# Patient Record
(demographics unavailable — no encounter records)

---

## 2025-01-29 NOTE — ASSESSMENT
[Diabetes Foot Care] : diabetes foot care [Long Term Vascular Complications] : long term vascular complications of diabetes [Carbohydrate Consistent Diet] : carbohydrate consistent diet [Importance of Diet and Exercise] : importance of diet and exercise to improve glycemic control, achieve weight loss and improve cardiovascular health [Exercise/Effect on Glucose] : exercise/effect on glucose [Hypoglycemia Management] : hypoglycemia management [Retinopathy Screening] : Patient was referred to ophthalmology for retinopathy screening [FreeTextEntry1] : Young white male with a past medical history of a type 2 diabetes, anxiety, hypertension, history of coronary artery disease and diffuse myalgia presents for routine follow-up.  Patient is tolerating the Trulicity 3 mg once a week together with the Synjardy 12.5/1000 mg twice daily.  His latest hemoglobin A1c is 5.5%, normal 3.49 and the LDL is 55.  Liver function test are normal and the total testosterone is low normal at 343.  Patient is trying hard to comply with the diet and has succeeded in losing approximately 20 pounds.  Patient also takes the levothyroxine 150 mcg tablets daily.  Patient recently was started on both and gabapentin with mild improvement.  Patient is clinically euthyroid but he still has diffuse myalgia for which he was evaluated by a neurologist.  And the workup is still in progress recommendation 1 I have advised the patient to continue with the Trulicity 3.0 milligrams once a week and the Synjardy 12.5/1000 mg tablet twice daily 2.  Patient will continue with the levothyroxine 150 mcg tablets daily 3.  The importance of strict low carbohydrate and low-fat diet bind with exercise was discussed with the patient in detail. 4.  Patient will continue with all the other medications which include losartan, Plavix, Repatha, baby aspirin, Protonix and losartan with Neurontin. 5.  Patient is encouraged to exercise on a regular basis and to continue his weight loss regimen, he will also continue the freestyle aaron 3 for monitoring of his blood sugars. 6.  The patient remains clinically well he will follow-up in 3 to 4 months with a repeat blood test at the plan was discussed in detail with the patient thank you

## 2025-01-29 NOTE — HISTORY OF PRESENT ILLNESS
[FreeTextEntry1] : 49-year-old male with a medical history of DM2 and hypothyroidism present for a routine follow up visit. Patient has been tolerating weekly injections of Trulicity well with no side effects. He is compliant with taking his medications of aspirin, gabapentin, Cymbalta, Brillinta, Levothyroxine, Losartan, Metoprolol, Pantoprazole, Rosuvastatin, Synjardy. Patient is currently wearing the Freestyle Starla 2 sensor to monitor his glucose levels; he does not prick his fingers. He states that he feels well and denies any significant symptoms of chest pain, sob, abdominal pain, nausea, vomiting, palpitations, muscle cramps. He does have a complaint of insomnia. Circulation of the lower extremities remain stable bilateral. Patient remains quite active physically; he is compliant with his diet and has lost some weight around 20 pounds. Review of systems otherwise is negative patient's vision has remained stable.  Patient claimed that he has been compliant with his diet has been avoiding foods which are rich in carbohydr Patient physically is not very active but he does perform work around the house

## 2025-01-29 NOTE — PHYSICAL EXAM
[Alert] : alert [Well Nourished] : well nourished [Obese] : obese [No Acute Distress] : no acute distress [Well Developed] : well developed [Normal Sclera/Conjunctiva] : normal sclera/conjunctiva [EOMI] : extra ocular movement intact [No Proptosis] : no proptosis [Normal Oropharynx] : the oropharynx was normal [Thyroid Not Enlarged] : the thyroid was not enlarged [No Thyroid Nodules] : no palpable thyroid nodules [No Respiratory Distress] : no respiratory distress [No Accessory Muscle Use] : no accessory muscle use [Clear to Auscultation] : lungs were clear to auscultation bilaterally [Normal S1, S2] : normal S1 and S2 [Normal Rate] : heart rate was normal [Regular Rhythm] : with a regular rhythm [No Edema] : no peripheral edema [Pedal Pulses Normal] : the pedal pulses are present [Normal Bowel Sounds] : normal bowel sounds [Not Tender] : non-tender [Not Distended] : not distended [Soft] : abdomen soft [No HSM] : no hepato-splenomegaly [Normal Supraclavicular Nodes] : no supraclavicular lymphadenopathy [Normal Anterior Cervical Nodes] : no anterior cervical lymphadenopathy [Normal Posterior Cervical Nodes] : no posterior cervical lymphadenopathy [No Spinal Tenderness] : no spinal tenderness [Spine Straight] : spine straight [No Stigmata of Cushings Syndrome] : no stigmata of Cushings Syndrome [Normal Gait] : normal gait [No Clubbing, Cyanosis] : no clubbing  or cyanosis of the fingernails [Normal Strength/Tone] : muscle strength and tone were normal [No Rash] : no rash [No Skin Lesions] : no skin lesions [Acanthosis Nigricans] : no acanthosis nigricans [Foot Ulcers] : no foot ulcers [No Motor Deficits] : the motor exam was normal [No Sensory Deficits] : the sensory exam was normal to light touch and pinprick [Normal Reflexes] : deep tendon reflexes were 2+ and symmetric [No Tremors] : no tremors [Oriented x3] : oriented to person, place, and time [Normal Insight/Judgement] : insight and judgment were intact [de-identified] : Patient BMI is down to 32.92 from 35.15 [de-identified] : Truncal obesity [de-identified] : Tenderness of the muscles on palpation

## 2025-01-29 NOTE — REVIEW OF SYSTEMS
[Fatigue] : fatigue [Decreased Appetite] : decreased appetite [Recent Weight Loss (___ Lbs)] : recent weight loss: [unfilled] lbs [Myalgia] : myalgia  [Dizziness] : no dizziness [Tremors] : no tremors [Pain/Numbness of Digits] : pain/numbness of digits [Depression] : depression [Insomnia] : insomnia [Polydipsia] : no polydipsia [Cold Intolerance] : no cold intolerance [Negative] : Heme/Lymph

## 2025-06-12 NOTE — HISTORY OF PRESENT ILLNESS
[FreeTextEntry1] : 50-year-old male with a medical hx of DM2 and hypothyroidism present for a routine follow up exam. Patient does have a past medical hx of Hashimoto's disease, hyperlipidemia. He is currently taking medications of Aspirin, Levothyroxine, Metoprolol, Pantoprazole, rosuvastatin, Synjardy, Toujeo and Trulicity. Patient has been tolerating Trulicity well with no side effects. Patient denies any other significant symptoms of hypo or hyperthyroidism. He denies any chest pain, sob, abdominal pain, nausea or vomiting. Patient states that he is restricted from an exercise regimen but due to his severe lumbar stenosis. He denies any changes in his vision; he states that he is due for an exam. Circulation of the lower extremities remain stable bilateral, denies any paresthesia. Patient states that his appetite has decreased as well as his cravings. Patient wears the Starla sensor to monitor his glucose levels which he has been in range 86% of the time. Patient remains clinically stable and will follow up with labs.   Decrease Levothyroxine to 137mcg A1C 5.2

## 2025-06-12 NOTE — PHYSICAL EXAM
[Alert] : alert [Well Nourished] : well nourished [No Acute Distress] : no acute distress [Well Developed] : well developed [Normal Sclera/Conjunctiva] : normal sclera/conjunctiva [EOMI] : extra ocular movement intact [No Proptosis] : no proptosis [Normal Oropharynx] : the oropharynx was normal [Thyroid Not Enlarged] : the thyroid was not enlarged [No Thyroid Nodules] : no palpable thyroid nodules [No Respiratory Distress] : no respiratory distress [No Accessory Muscle Use] : no accessory muscle use [Clear to Auscultation] : lungs were clear to auscultation bilaterally [Normal S1, S2] : normal S1 and S2 [Normal Rate] : heart rate was normal [Regular Rhythm] : with a regular rhythm [Carotids Normal] : carotid pulses were normal with no bruits [No Edema] : no peripheral edema [Pedal Pulses Normal] : the pedal pulses are present [Normal Bowel Sounds] : normal bowel sounds [Not Tender] : non-tender [Not Distended] : not distended [Soft] : abdomen soft [No HSM] : no hepato-splenomegaly [Normal Supraclavicular Nodes] : no supraclavicular lymphadenopathy [Normal Anterior Cervical Nodes] : no anterior cervical lymphadenopathy [Normal Posterior Cervical Nodes] : no posterior cervical lymphadenopathy [No Spinal Tenderness] : no spinal tenderness [Spine Straight] : spine straight [No Stigmata of Cushings Syndrome] : no stigmata of Cushings Syndrome [Normal Gait] : normal gait [Normal Strength/Tone] : muscle strength and tone were normal [No Rash] : no rash [Acanthosis Nigricans] : no acanthosis nigricans [No Motor Deficits] : the motor exam was normal [No Sensory Deficits] : the sensory exam was normal to light touch and pinprick [Normal Reflexes] : deep tendon reflexes were 2+ and symmetric [No Tremors] : no tremors [Oriented x3] : oriented to person, place, and time [de-identified] : Patient BMI is down to 30.41 and his weight is 218 pounds. [de-identified] : Truncal obesity

## 2025-06-12 NOTE — ASSESSMENT
[Diabetes Foot Care] : diabetes foot care [Long Term Vascular Complications] : long term vascular complications of diabetes [Carbohydrate Consistent Diet] : carbohydrate consistent diet [Importance of Diet and Exercise] : importance of diet and exercise to improve glycemic control, achieve weight loss and improve cardiovascular health [Exercise/Effect on Glucose] : exercise/effect on glucose [Hypoglycemia Management] : hypoglycemia management [Self Monitoring of Blood Glucose] : self monitoring of blood glucose [Retinopathy Screening] : Patient was referred to ophthalmology for retinopathy screening [FreeTextEntry1] : -year-old white male with a past medical history of type 2 diabetes, hyperlipidemia, primary hypothyroidism and coronary artery disease presents for routine follow-up.  Patient has become more compliant with his diet and has succeeded in losing approximately 30 pounds.  Also has patient carries a the glucose sensor freestyle aaron 2 but apparently has not been using it for the past 3 weeks as he ran out of the sensors.  Prior to that he's sugar was 84% of the time within range.  Patient is metabolically stable but he is complaining of chronic fatigue and tiredness and a decrease in his sex drive.  Recommendation 1.  I have advised the patient to continue his current diabetic medications which include Trulicity 3 mg solution once a week, Xigduo XR 5/1000 mg twice a day and insulin Toujeo 50 units at night if needed.. 2.  Patient recently had a blood test and the hemoglobin A1c was 5.2%, total testosterone of 415, TSH level was low at 0.17 and the complete metabolic panel is normal.  His lipid panel is also normal with a total cholesterol of 121 and LDL of 59 mg per DL.  The TSH level is 0.17. 3.  Patient is advised to continue his current diabetic diet and is encouraged to do minimal exercise and walking as tolerated 4.  I would also lower the dose of the levothyroxine to 137 mcg tablets every day in order to keep the patient euthyroid. 5.  If the patient remains clinically stable then he will follow-up in 3 months time with a repeat blood test.  The plan was discussed with the patient in detail thank you